# Patient Record
(demographics unavailable — no encounter records)

---

## 2025-01-02 NOTE — ASSESSMENT
[No evidence of disease] : No evidence of disease
Launch Heart Score Calculator…

## 2025-01-08 NOTE — REASON FOR VISIT
[Routine Follow-Up] : routine follow-up visit for [Prostate Cancer] : prostate cancer [Other: ______] : provided by MANUEL [Interpreters_IDNumber] : [id number] [TWNoteComboBox1] : Afghan

## 2025-01-08 NOTE — REASON FOR VISIT
[Routine Follow-Up] : routine follow-up visit for [Prostate Cancer] : prostate cancer [Other: ______] : provided by MANUEL [Interpreters_IDNumber] : [id number] [TWNoteComboBox1] : [place]an

## 2025-01-08 NOTE — DISEASE MANAGEMENT
[Clinical] : TNM Stage: c [III] : III [FreeTextEntry4] : GS 3+4, PSA 24 [TTNM] : 1 [NTNM] : 0 [MTNM] : 0 [de-identified] : 81 Gy [de-identified] : prostate

## 2025-01-08 NOTE — HISTORY OF PRESENT ILLNESS
[FreeTextEntry1] : 52 y/o male with h/o radiation for prostate adenocarcinoma in 7/2022 presents for f/u.  1/2022 --- PSA 24.63 1/2022 --- MRI showed prostate vol 22cc. PI-RADS 4. No MILTON, no SV invasion.  1/2022 -- biopsy showed prostate adenocarcinoma. Stephen 3+4 in 3 cores, 40% involvement; G3+3 in 4 cores. Total 13 cores biopsied.  2/2022-- CT scan showed no metastasis.   Last OTV note during RT in 7/2022: 45/45 fractions of RT to prostate completed. No bloody stool, rectal pain, or urinary bother. Pt declined to take flomax for frequent urination.  As per Dr Muñoz, 1st ADT injection on 5/12/2022.  12/2022 -- PSA 0.05 with Dr Muñoz @ [facility].  9/2023 -- PSA 0.02 3/2024 -- PSA <0.01 11/2024 -- PSA <0.01  1/3/2025 f/u. Pt completed 81 Gy / 45 Fx of RT to prostate in 7/2022. No urinary or bowel movement problems. Nocturia X2. Still on ADT with Med Onc Dr Muñoz @ [facility] sine 6/2022. Advised to continue f/u with urologist Dylan Lao.

## 2025-01-08 NOTE — DISEASE MANAGEMENT
[Clinical] : TNM Stage: c [III] : III [FreeTextEntry4] : GS 3+4, PSA 24 [TTNM] : 1 [NTNM] : 0 [MTNM] : 0 [de-identified] : 81 Gy [de-identified] : prostate

## 2025-01-08 NOTE — REVIEW OF SYSTEMS
[Negative] : Allergic/Immunologic [Anal Pain: Grade 0] : Anal Pain: Grade 0 [Constipation: Grade 0] : Constipation: Grade 0 [Diarrhea: Grade 0] : Diarrhea: Grade 0 [Dyspepsia: Grade 0] : Dyspepsia: Grade 0 [Dysphagia: Grade 0] : Dysphagia: Grade 0 [Esophagitis: Grade 0] : Esophagitis: Grade 0 [Fecal Incontinence: Grade 0] : Fecal Incontinence: Grade 0 [Gastroparesis: Grade 0] : Gastroparesis: Grade 0 [Nausea: Grade 0] : Nausea: Grade 0 [Proctitis: Grade 0] : Proctitis: Grade 0 [Rectal Pain: Grade 0] : Rectal Pain: Grade 0 [Small Intestinal Obstruction: Grade 0] : Small Intestinal Obstruction: Grade 0 [Vomiting: Grade 0] : Vomiting: Grade 0 [Hematuria: Grade 0] : Hematuria: Grade 0 [Urinary Incontinence: Grade 0] : Urinary Incontinence: Grade 0  [Urinary Retention: Grade 0] : Urinary Retention: Grade 0 [Urinary Tract Pain: Grade 0] : Urinary Tract Pain: Grade 0 [Urinary Urgency: Grade 0] : Urinary Urgency: Grade 0 [Urinary Frequency: Grade 1 - Present] : Urinary Frequency: Grade 1 - Present [FreeTextEntry8] : BPH, prostate cancer

## 2025-01-08 NOTE — REVIEW OF SYSTEMS
Detail Level: Detailed Add 66850 Cpt? (Important Note: In 2017 The Use Of 86335 Is Being Tracked By Cms To Determine Future Global Period Reimbursement For Global Periods): yes [Negative] : Allergic/Immunologic [Anal Pain: Grade 0] : Anal Pain: Grade 0 [Constipation: Grade 0] : Constipation: Grade 0 [Diarrhea: Grade 0] : Diarrhea: Grade 0 [Dyspepsia: Grade 0] : Dyspepsia: Grade 0 [Dysphagia: Grade 0] : Dysphagia: Grade 0 [Esophagitis: Grade 0] : Esophagitis: Grade 0 [Fecal Incontinence: Grade 0] : Fecal Incontinence: Grade 0 [Gastroparesis: Grade 0] : Gastroparesis: Grade 0 [Nausea: Grade 0] : Nausea: Grade 0 [Proctitis: Grade 0] : Proctitis: Grade 0 [Rectal Pain: Grade 0] : Rectal Pain: Grade 0 [Small Intestinal Obstruction: Grade 0] : Small Intestinal Obstruction: Grade 0 [Vomiting: Grade 0] : Vomiting: Grade 0 [Hematuria: Grade 0] : Hematuria: Grade 0 [Urinary Incontinence: Grade 0] : Urinary Incontinence: Grade 0  [Urinary Retention: Grade 0] : Urinary Retention: Grade 0 [Urinary Tract Pain: Grade 0] : Urinary Tract Pain: Grade 0 [Urinary Urgency: Grade 0] : Urinary Urgency: Grade 0 [Urinary Frequency: Grade 1 - Present] : Urinary Frequency: Grade 1 - Present [FreeTextEntry8] : BPH, prostate cancer

## 2025-01-08 NOTE — HISTORY OF PRESENT ILLNESS
[FreeTextEntry1] : 50 y/o male with h/o radiation for prostate adenocarcinoma in 7/2022 presents for f/u.  1/2022 --- PSA 24.63 1/2022 --- MRI showed prostate vol 22cc. PI-RADS 4. No MILTON, no SV invasion.  1/2022 -- biopsy showed prostate adenocarcinoma. Stephen 3+4 in 3 cores, 40% involvement; G3+3 in 4 cores. Total 13 cores biopsied.  2/2022-- CT scan showed no metastasis.   Last OTV note during RT in 7/2022: 45/45 fractions of RT to prostate completed. No bloody stool, rectal pain, or urinary bother. Pt declined to take flomax for frequent urination.  As per Dr Muñoz, 1st ADT injection on 5/12/2022.  12/2022 -- PSA 0.05 with Dr Muñoz @ [facility].  9/2023 -- PSA 0.02 3/2024 -- PSA <0.01 11/2024 -- PSA <0.01  1/3/2025 f/u. Pt completed 81 Gy / 45 Fx of RT to prostate in 7/2022. No urinary or bowel movement problems. Nocturia X2. Still on ADT with Med Onc Dr Muñoz @ [facility] sine 6/2022. Advised to continue f/u with urologist Dylan Lao.

## 2025-01-08 NOTE — HISTORY OF PRESENT ILLNESS
[FreeTextEntry1] : 50 y/o male with h/o radiation for prostate adenocarcinoma in 7/2022 presents for f/u.  1/2022 --- PSA 24.63 1/2022 --- MRI showed prostate vol 22cc. PI-RADS 4. No MILTON, no SV invasion.  1/2022 -- biopsy showed prostate adenocarcinoma. Stephen 3+4 in 3 cores, 40% involvement; G3+3 in 4 cores. Total 13 cores biopsied.  2/2022-- CT scan showed no metastasis.   Last OTV note during RT in 7/2022: 45/45 fractions of RT to prostate completed. No bloody stool, rectal pain, or urinary bother. Pt declined to take flomax for frequent urination.  As per Dr Muñoz, 1st ADT injection on 5/12/2022.  12/2022 -- PSA 0.05 with Dr Muñoz @ [facility].  9/2023 -- PSA 0.02 3/2024 -- PSA <0.01 11/2024 -- PSA <0.01  1/3/2025 f/u. Pt completed 81 Gy / 45 Fx of RT to prostate in 7/2022. No urinary or bowel movement problems. Nocturia X2. Still on ADT with Med Onc Dr Muñoz @ [facility] sine 6/2022. Advised to continue f/u with urologist Dylan Lao.

## 2025-01-08 NOTE — DISEASE MANAGEMENT
[Clinical] : TNM Stage: c [III] : III [FreeTextEntry4] : GS 3+4, PSA 24 [TTNM] : 1 [NTNM] : 0 [MTNM] : 0 [de-identified] : 81 Gy [de-identified] : prostate

## 2025-01-08 NOTE — REASON FOR VISIT
[Routine Follow-Up] : routine follow-up visit for [Prostate Cancer] : prostate cancer [Other: ______] : provided by MANUEL [Interpreters_IDNumber] : [id number] [TWNoteComboBox1] : Brazilian

## 2025-07-16 NOTE — DISEASE MANAGEMENT
[Clinical] : TNM Stage: c [III] : III [FreeTextEntry4] : GS 3+4, PSA 24 [TTNM] : 1 [NTNM] : 0 [MTNM] : 0 [de-identified] : 81 Gy [de-identified] : prostate

## 2025-07-16 NOTE — REASON FOR VISIT
[Routine Follow-Up] : routine follow-up visit for [Prostate Cancer] : prostate cancer [TWNoteComboBox1] : False

## 2025-07-22 NOTE — DISEASE MANAGEMENT
[FreeTextEntry4] : GS 3+4, PSA 24 [TTNM] : 1 [NTNM] : 0 [MTNM] : 0 [de-identified] : 81 Gy [de-identified] : prostate

## 2025-07-22 NOTE — HISTORY OF PRESENT ILLNESS
[FreeTextEntry1] : 51 y/o male with h/o radiation for prostate adenocarcinoma in 7/2022 presents for f/u.  1/2022 --- PSA 24.63 1/2022 --- MRI showed prostate vol 22cc. PI-RADS 4. No MILTON, no SV invasion.  1/2022 -- biopsy showed prostate adenocarcinoma. Stephen 3+4 in 3 cores, 40% involvement; G3+3 in 4 cores. Total 13 cores biopsied.  2/2022-- CT scan showed no metastasis.   Last OTV note during RT in 7/2022: 45/45 fractions of RT to prostate completed. No bloody stool, rectal pain, or urinary bother. Pt declined to take flomax for frequent urination.  As per Dr Muñoz, 1st ADT injection on 5/12/2022.  12/2022 -- PSA 0.05 with Dr Muñoz @ [facility].  9/2023 -- PSA 0.02 3/2024 -- PSA <0.01 11/2024 -- PSA <0.01 3/2025 -- PSA <0.01  1/3/2025 f/u. Pt completed 81 Gy / 45 Fx of RT to prostate in 7/2022. No urinary or bowel movement problems. Nocturia X2. Still on ADT with Med Onc Dr Muñoz @ [facility] sine 6/2022. Advised to continue f/u with urologist Dylan Lao.  7/18/2025 F/U. PSA <0.01 in 3/2025